# Patient Record
Sex: MALE
[De-identification: names, ages, dates, MRNs, and addresses within clinical notes are randomized per-mention and may not be internally consistent; named-entity substitution may affect disease eponyms.]

---

## 2018-07-19 ENCOUNTER — HOSPITAL ENCOUNTER (EMERGENCY)
Dept: HOSPITAL 42 - ED | Age: 58
Discharge: HOME | End: 2018-07-19
Payer: COMMERCIAL

## 2018-07-19 VITALS — BODY MASS INDEX: 29.2 KG/M2

## 2018-07-19 VITALS
DIASTOLIC BLOOD PRESSURE: 85 MMHG | OXYGEN SATURATION: 99 % | RESPIRATION RATE: 18 BRPM | TEMPERATURE: 97.8 F | SYSTOLIC BLOOD PRESSURE: 132 MMHG | HEART RATE: 79 BPM
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
## 2018-07-19 DIAGNOSIS — S00.452A: Primary | ICD-10-CM

## 2018-07-19 NOTE — ED PDOC
Arrival/HPI





- General


Chief Complaint: ENT Problem


Time Seen by Provider: 07/19/18 07:56





- History of Present Illness


Narrative History of Present Illness (Text): 


57 y/o M p/w L ear foreign body since last night. Patient was cleaning ear with 

Q-tip and cotton got stuck in ear. Denies pain.





Past Medical History





- Infectious Disease


Hx of Infectious Diseases: None





- Cardiac


Hx Cardiac Disorders: Yes





- Psychiatric


Hx Substance Use: No





Family/Social History


Family/Social History: Unknown Family HX


Smoking Status: Never Smoked


Hx Alcohol Use: No


Hx Substance Use: No





Allergies/Home Meds


Allergies/Adverse Reactions: 


Allergies





No Known Allergies Allergy (Verified 07/19/18 07:51)


 








Home Medications: 


 Home Meds











 Medication  Instructions  Recorded  Confirmed


 


No Known Home Med  07/19/18 07/19/18














Review of Systems





- Review of Systems


Constitutional: absent: Fevers


ENT: absent: Tinnitus





Physical Exam





- Physical Exam


Narrative Physical Exam (Text): 


Gen: NAD


ENT: Cotton in L ear canal, no bleeding, TM normal


Neuro: Alert


Vital Signs











  Temp Pulse Resp BP Pulse Ox


 


 07/19/18 08:07  97.8 F  79  18  132/85  99


 


 07/19/18 07:48  97.8 F  79  18  132/85  99














Medical Decision Making


ED Course and Treatment: 


Cotton removed with alligator forceps without complication.





Disposition/Present on Arrival





- Present on Arrival


Any Indicators Present on Arrival: No


History of DVT/PE: No


History of Uncontrolled Diabetes: No


Urinary Catheter: No


History of Decub. Ulcer: No


History Surgical Site Infection Following: None





- Disposition


Have Diagnosis and Disposition been Completed?: Yes


Diagnosis: 


 Non-penetrating foreign body in left ear canal





Disposition: HOME/ ROUTINE


Disposition Time: 07:57


Patient Plan: Discharge


Condition: STABLE


Forms:  [facility] (English), WORK NOTE